# Patient Record
Sex: FEMALE | Race: WHITE | NOT HISPANIC OR LATINO
[De-identification: names, ages, dates, MRNs, and addresses within clinical notes are randomized per-mention and may not be internally consistent; named-entity substitution may affect disease eponyms.]

---
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
## 2022-04-04 NOTE — ED PEDIATRIC NURSE NOTE - CAS EDN DISCHARGE ASSESSMENT
Alert and oriented to person, place and time Price (Use Numbers Only, No Special Characters Or $): 60 Consultation Charge $ (Use Numbers Only, No Special Characters Or $): 60

## 2022-08-01 NOTE — ED PEDIATRIC NURSE NOTE - CAS EDN INTEG ASSESS
Lab Results   Component Value Date    EGFR 20 08/01/2022    EGFR 42 07/08/2022    EGFR 42 06/10/2022    CREATININE 2 14 (H) 08/01/2022    CREATININE 1 20 07/08/2022    CREATININE 1 19 06/10/2022     · Most recently baseline Cr has been 1 2  · 2 14 on admission  · Trend daily and replace fluids via IVF WDL

## 2023-11-28 NOTE — CONSULT NOTE PEDS - ASSESSMENT
Pt here for abd pain for three days. Denies fevers/vomiting/diarreha. Pt came back from [place] on 11/26. Denies pain with urination. Last BM 11/26 pt endorses relief after BM but mild pain came back.    
17 y/o girl with no PMH presents for 1 day of acute onset R side abdominal pain with associated nausea/vomiting, afebrile, WBC 9.5, TTP over RLQ on exam without rebound/guarding, and with ultrasound unable to visualize appendix but revealing 2.3cm L ovarian cyst.  Clinical picture consistent with acute appendicitis, ovarian cyst likely an incidental finding.      Plan:  - CT abdomen/pelvis w/ IV contrast to rule out appendicitis  - will form further plan pending CT
